# Patient Record
(demographics unavailable — no encounter records)

---

## 2025-01-06 NOTE — PHYSICAL EXAM
[2+] : left foot dorsalis pedis 2+ [No Joint Swelling] : no joint swelling [Normal Foot/Ankle] : Both lower extremities were exposed and visualized. Standing exam demonstrates normal foot posture and alignment. Hindfoot exam shows no hindfoot valgus or varus [Skin Color & Pigmentation] : normal skin color and pigmentation [Skin Turgor] : normal skin turgor [Skin Lesions] : no skin lesions [Sensation] : the sensory exam was normal to light touch and pinprick [No Focal Deficits] : no focal deficits [Deep Tendon Reflexes (DTR)] : deep tendon reflexes were 2+ and symmetric [Motor Exam] : the motor exam was normal [General Appearance - Alert] : alert [General Appearance - Well-Appearing] : healthy appearing [Oriented To Time, Place, And Person] : oriented to person, place, and time [Ankle Swelling (On Exam)] : not present [Varicose Veins Of Lower Extremities] : not present [] : not present [Delayed in the Right Toes] : capillary refills normal in right toes [Delayed in the Left Toes] : capillary refills normal in the left toes [de-identified] : Pain on palpation of the medial tuberosity of the left heel and at the insertion of the plantar fascia.  Pain with stretching of the plantar fascia.  No pain on medial to lateral compression of the heel.  Forefoot varus, fully compensated.  HAV with bunion.  There is some mild bursitis overlying the bunion.   [Foot Ulcer] : no foot ulcer [Skin Induration] : no skin induration

## 2025-01-06 NOTE — ASSESSMENT
[FreeTextEntry1] : Impression: Plantar fasciitis, left (M72.2) with calcaneal spur (M77.32).  Bunion, left (M21.619).  History of gout (Z87.39).  Treatment: More than likely the 1st MPJ pain is bursitis and not gout.  For this, patient was given a Medrol dose pack.  This should treat both symptoms.  Patient was instructed to stretch and ice the left heel for the plantar fasciitis.  This should give the patient relief.  If not, then the patient should return to the office and will consider an injection at that time.  Patient was given home care instructions.  Will reevaluate as needed.  Any questions or problems, patient is to contact the office.

## 2025-01-06 NOTE — PHYSICAL EXAM
[2+] : left foot dorsalis pedis 2+ [No Joint Swelling] : no joint swelling [Normal Foot/Ankle] : Both lower extremities were exposed and visualized. Standing exam demonstrates normal foot posture and alignment. Hindfoot exam shows no hindfoot valgus or varus [Skin Color & Pigmentation] : normal skin color and pigmentation [Skin Turgor] : normal skin turgor [Skin Lesions] : no skin lesions [Sensation] : the sensory exam was normal to light touch and pinprick [No Focal Deficits] : no focal deficits [Deep Tendon Reflexes (DTR)] : deep tendon reflexes were 2+ and symmetric [Motor Exam] : the motor exam was normal [General Appearance - Alert] : alert [General Appearance - Well-Appearing] : healthy appearing [Oriented To Time, Place, And Person] : oriented to person, place, and time [Ankle Swelling (On Exam)] : not present [Varicose Veins Of Lower Extremities] : not present [] : not present [Delayed in the Right Toes] : capillary refills normal in right toes [Delayed in the Left Toes] : capillary refills normal in the left toes [de-identified] : Pain on palpation of the medial tuberosity of the left heel and at the insertion of the plantar fascia.  Pain with stretching of the plantar fascia.  No pain on medial to lateral compression of the heel.  Forefoot varus, fully compensated.  HAV with bunion.  There is some mild bursitis overlying the bunion.   [Foot Ulcer] : no foot ulcer [Skin Induration] : no skin induration

## 2025-01-06 NOTE — HISTORY OF PRESENT ILLNESS
[FreeTextEntry1] : Patient presents today with a chief complaint of left heel pain that started about 2months ago.  Patient also has a history of gout at the 1st MPJ.  He states that he has pain at the left bunion.  At this time, it does not appear to be gout.  He is here for evaluation.

## 2025-01-06 NOTE — PHYSICAL EXAM
[2+] : left foot dorsalis pedis 2+ [No Joint Swelling] : no joint swelling [Normal Foot/Ankle] : Both lower extremities were exposed and visualized. Standing exam demonstrates normal foot posture and alignment. Hindfoot exam shows no hindfoot valgus or varus [Skin Color & Pigmentation] : normal skin color and pigmentation [Skin Turgor] : normal skin turgor [Skin Lesions] : no skin lesions [Sensation] : the sensory exam was normal to light touch and pinprick [No Focal Deficits] : no focal deficits [Deep Tendon Reflexes (DTR)] : deep tendon reflexes were 2+ and symmetric [Motor Exam] : the motor exam was normal [General Appearance - Alert] : alert [General Appearance - Well-Appearing] : healthy appearing [Oriented To Time, Place, And Person] : oriented to person, place, and time [Ankle Swelling (On Exam)] : not present [Varicose Veins Of Lower Extremities] : not present [] : not present [Delayed in the Right Toes] : capillary refills normal in right toes [Delayed in the Left Toes] : capillary refills normal in the left toes [de-identified] : Pain on palpation of the medial tuberosity of the left heel and at the insertion of the plantar fascia.  Pain with stretching of the plantar fascia.  No pain on medial to lateral compression of the heel.  Forefoot varus, fully compensated.  HAV with bunion.  There is some mild bursitis overlying the bunion.   [Foot Ulcer] : no foot ulcer [Skin Induration] : no skin induration

## 2025-01-06 NOTE — PROCEDURE
[FreeTextEntry1] : X-rays were taken to rule out for fracture. (3 views - left foot) There is an inferior calcaneal spur.  HAV with bunion.  Metatarsus primus adductus.

## 2025-01-06 NOTE — ASSESSMENT
[FreeTextEntry1] : Impression: Plantar fasciitis, left (M72.2) with calcaneal spur (M77.32).  Bunion, left (M21.619).  History of gout (Z87.39).  Treatment: More than likely the 1st MPJ pain is bursitis and not gout.  For this, patient was given a Medrol dose pack.  This should treat both symptoms.  Patient was instructed to stretch and ice the left heel for the plantar fasciitis.  This should give the patient relief.  If not, then the patient should return to the office and will consider an injection at that time.  Patient was given home care instructions.  Will reevaluate as needed.  Any questions or problems, patient is to contact the office.  oral